# Patient Record
Sex: FEMALE | Race: WHITE | NOT HISPANIC OR LATINO | Employment: PART TIME | URBAN - METROPOLITAN AREA
[De-identification: names, ages, dates, MRNs, and addresses within clinical notes are randomized per-mention and may not be internally consistent; named-entity substitution may affect disease eponyms.]

---

## 2017-04-26 ENCOUNTER — TELEPHONE (OUTPATIENT)
Dept: URGENT CARE | Facility: CLINIC | Age: 42
End: 2017-04-26

## 2021-11-04 ENCOUNTER — OFFICE VISIT (OUTPATIENT)
Dept: ORTHOPEDIC SURGERY | Facility: CLINIC | Age: 46
End: 2021-11-04

## 2021-11-04 VITALS
WEIGHT: 268.96 LBS | BODY MASS INDEX: 49.49 KG/M2 | HEIGHT: 62 IN | SYSTOLIC BLOOD PRESSURE: 112 MMHG | DIASTOLIC BLOOD PRESSURE: 80 MMHG

## 2021-11-04 DIAGNOSIS — M75.21 BICEPS TENDINITIS OF RIGHT UPPER EXTREMITY: ICD-10-CM

## 2021-11-04 DIAGNOSIS — M75.51 BURSITIS OF RIGHT SHOULDER: ICD-10-CM

## 2021-11-04 DIAGNOSIS — M75.01 ADHESIVE CAPSULITIS OF RIGHT SHOULDER: Primary | ICD-10-CM

## 2021-11-04 DIAGNOSIS — M75.41 IMPINGEMENT SYNDROME OF RIGHT SHOULDER: ICD-10-CM

## 2021-11-04 PROCEDURE — 99204 OFFICE O/P NEW MOD 45 MIN: CPT | Performed by: ORTHOPAEDIC SURGERY

## 2021-11-04 RX ORDER — METHYLPREDNISOLONE 4 MG/1
TABLET ORAL
Qty: 1 EACH | Refills: 0 | Status: SHIPPED | OUTPATIENT
Start: 2021-11-04 | End: 2022-01-14

## 2021-11-04 RX ORDER — MELOXICAM 7.5 MG/1
TABLET ORAL
Qty: 30 TABLET | Refills: 1 | Status: SHIPPED | OUTPATIENT
Start: 2021-11-04 | End: 2022-01-14

## 2021-11-04 NOTE — PROGRESS NOTES
[facility] – [place] Orthopaedic Surgery Office Visit - Tacho Perkins MD    Office Visit       Patient Name: Lyndon Monaco    Chief Complaint:   Chief Complaint   Patient presents with   • Right Shoulder - Pain       Referring Physician: Angeles Amaya DNP, AP*-I appreciate the referral    History of Present Illness:   Lyndon Monaco is a 46 y.o. female who presents with right body part: shoulder Reason: pain.  Onset:Onset: atraumatic and gradual in nature. The issue has been ongoing for 2 month(s). Pain is a 2/10 on the pain scale. Pain is described as Pain Characterization: throbbing and sharp. Associated symptoms include Symptoms: pain. The pain is worse with sleeping, rising from seated position and reaching above, behind and lifting; ice and pain medication and/or NSAID improve the pain. Previous treatments have included: NSAIDS and physical therapy.  I have reviewed the patient's history of present illness as noted/entered above.    I have reviewed the patient's past medical history, surgical history, social history, family history, medications, and allergies as noted in the electronic medical record and as noted/entered.  I have reviewed the patient's review of systems as noted/enter and updated as noted in the patient's HPI.    Right shoulder pain lifting boxes few weeks ago prior to urgent treatment center visit on 10/30/2021    Angeles Amaya DNP, [facility] note reviewed from 10/30/2021 right shoulder pain    Perhaps a fall 5 years ago may explain the history of healed prior clavicle fracture but patient was unaware of that    ANTERIOR BICEPS PAIN - after lifting boxes; moving her daughter    Work: [employer] - ; daughter professional ballet dancer      Subjective   Subjective      Review of Systems   Constitutional: Negative.  Negative for chills, fatigue and fever.   HENT: Negative.  Negative for congestion and dental problem.    Eyes:  "Negative.  Negative for blurred vision.   Respiratory: Negative.  Negative for shortness of breath.    Cardiovascular: Negative.  Negative for leg swelling.   Gastrointestinal: Negative.  Negative for abdominal pain.   Endocrine: Negative.  Negative for polyuria.   Genitourinary: Negative.  Negative for difficulty urinating.   Musculoskeletal: Positive for arthralgias.   Skin: Negative.    Allergic/Immunologic: Negative.    Neurological: Negative.    Hematological: Negative.  Negative for adenopathy.   Psychiatric/Behavioral: Negative.  Negative for behavioral problems.        Past Medical History: No past medical history on file.    Past Surgical History:   Past Surgical History:   Procedure Laterality Date   •  SECTION     • CHOLECYSTECTOMY     • KNEE ARTHROSCOPY     • TONSILLECTOMY         Family History: No family history on file.    Social History:   Social History     Socioeconomic History   • Marital status: Other   Tobacco Use   • Smoking status: Never Smoker   • Smokeless tobacco: Never Used   Substance and Sexual Activity   • Alcohol use: Defer   • Drug use: Defer   • Sexual activity: Defer       Medications:   Current Outpatient Medications:   •  meloxicam (MOBIC) 7.5 MG tablet, 1 Oral Daily with food., Disp: 30 tablet, Rfl: 1  •  methylPREDNISolone (MEDROL) 4 MG dose pack, Use as directed by package instructions, Disp: 1 each, Rfl: 0    Allergies: No Known Allergies    The following portions of the patient's history were reviewed and updated as appropriate: allergies, current medications, past family history, past medical history, past social history, past surgical history and problem list.        Objective    Objective      Vital Signs:   Vitals:    21 1454   BP: 112/80   Weight: 122 kg (268 lb 15.4 oz)   Height: 157.5 cm (62.01\")       BMI 49     Ortho Exam:  General: no acute distress, comfortable  Vitals reviewed in chart    Musculoskeletal Exam    SIDE: RIGHT SHOULDER  Shoulder " Exam:  Range of motion measurements (degrees)  Forward flexion/Abduction/External rotation at side/ER at 90/IR at 90/IR position  Active: 130/130/30/70/30/buttock  Passive: 150/150/40/70/30/buttock      Anterior biceps pain    Diminished internal rotation and external rotation  Painful arc of motion  No evidence of septic joint  Pain with forward flexion and abduction greater than 120  Impingement testing Neer's test - positive/painful  Impingement testing Hawkin's test - positive/painful  Rotator cuff testing Anneliese's test - mild pain but no obvious weakness, pain limited  Rotator cuff testing External rotation - no weakness  Rotator cuff testing Lag signs - absent  Rotator cuff testing Belly press - negative  Scapular dyskinesis - present, abnormal scapular motion      Results Review:   Imaging Results (Last 24 Hours)     ** No results found for the last 24 hours. **        XR Shoulder 2+ View Right    Result Date: 10/31/2021  Old healed distal right clavicle fracture. No evidence of acute right shoulder trauma or advanced degenerative change.  DICTATED:   10/30/2021 EDITED/lfs:   10/30/2021    This report was finalized on 10/31/2021 9:02 PM by Dr. Gregorio Clark MD.          Right shoulder radiographs personally interpreted old healed distal third clavicle fracture.  No acute bony findings noted.    Procedures           Assessment / Plan      Assessment/Plan:   Problem List Items Addressed This Visit        Musculoskeletal and Injuries    Adhesive capsulitis of right shoulder - Primary    Relevant Medications    methylPREDNISolone (MEDROL) 4 MG dose pack    meloxicam (MOBIC) 7.5 MG tablet    Other Relevant Orders    Ambulatory Referral to Physical Therapy Ortho, Evaluate and treat    Biceps tendinitis of right upper extremity    Relevant Medications    methylPREDNISolone (MEDROL) 4 MG dose pack    meloxicam (MOBIC) 7.5 MG tablet    Other Relevant Orders    Ambulatory Referral to Physical Therapy Ortho, Evaluate and  treat    Bursitis of right shoulder    Relevant Medications    methylPREDNISolone (MEDROL) 4 MG dose pack    meloxicam (MOBIC) 7.5 MG tablet    Other Relevant Orders    Ambulatory Referral to Physical Therapy Ortho, Evaluate and treat    Impingement syndrome of right shoulder    Relevant Medications    methylPREDNISolone (MEDROL) 4 MG dose pack    meloxicam (MOBIC) 7.5 MG tablet    Other Relevant Orders    Ambulatory Referral to Physical Therapy Ortho, Evaluate and treat          RIGHT SHOULDER    Selective rest/activity modifications recommended while the shoulder recovers, although stretching and physical therapy are encouraged.    Medrol dosepak - risks and benefit of this medication was discussed including risks with Diabetes and bump in blood glucose.  A 6-day steroid Medrol dosepak was recommended followed by an NSAID after the dosepak is finished.      NSAIDs - risks and benefits of these medications were discussed.  These medications are certainly not without risks, but they can provide relief of pain caused due to capsular inflammation with this condition.    Physical therapy prescription provided and stretching program discussed    Steroid injection - a steroid injection can be beneficial and was offered.  Risks and benefits were discussed including a bump in blood glucose in the case of Diabetes.  She desires to hold for now.    Follow-up - the patient can schedule an appointment for 2 months pending progress with the nonoperative treatment program    Patient counseling was performed with a discussion of the following:  What is a frozen shoulder?  Frozen Shoulder or Idiopathic Adhesive Capsulitis - the patient has a diagnosis of idiopathic adhesive capsulitis or “frozen shoulder.”  We discussed that this condition can have variable “freezing” and “thawing” phases that can be very painful.  Fortunately, this condition rarely requires operative intervention and responds to conservative measures gradually  over time.  Unfortunately, I did  that the symptoms can last up to 6-12 months in some patients and frozen shoulder can return to the same shoulder or impact the other shoulder in the future.    What is our plan to help nonoperatively treat frozen shoulder?  We discussed a plan for selective rest/activity modification, NSAIDs - risks and benefits of these medications discussed, frozen shoulder exercises demonstrated with emphasis on range of motion and physical therapy prescription given, and the option of a corticosteroid injection.  The patient may be a candidate for a 6-day Medrol dose pack and then start an NSAID after the Medrol dose pack is finished.      When will I see the patient back?  I will see the patient back in 2 months to follow-up their progress pending progress or sooner if needed.  If the patient is improved then they can call to cancel the appointment.  If the patient has improved range of motion, but continued pain, then we will look for other potential causes of shoulder pain at the follow-up appointment.  The meredith now is to improve the range of motion and gradually decrease the pain they are experiencing.    Is an MRI needed at this time?   I counseled the patient that an MRI will not diagnose a frozen shoulder, but an MRI is indicated in the patient is refractory to our nonoperative treatment program for frozen shoulder.  An MRI can help to look for other potential causes of shoulder pain pending response to nonoperative treatment.      Follow Up: 2 months        Tacho Perkins MD, FAAOS  Orthopedic Surgeon  Fellowship Trained Shoulder and Elbow Surgeon  [facility]  Orthopedics and Sports Medicine  [address] [place] [address], [address]  [place] [address]    11/08/21  14:07 EST    Please note that portions of this note may have been completed with a voice recognition program. Efforts were made to edit the dictations, but occasionally words are mistranscribed.

## 2021-11-08 ENCOUNTER — TELEPHONE (OUTPATIENT)
Dept: ORTHOPEDIC SURGERY | Facility: CLINIC | Age: 46
End: 2021-11-08

## 2021-11-08 DIAGNOSIS — M75.01 ADHESIVE CAPSULITIS OF RIGHT SHOULDER: Primary | ICD-10-CM

## 2021-11-08 DIAGNOSIS — M75.21 BICEPS TENDINITIS OF RIGHT UPPER EXTREMITY: ICD-10-CM

## 2021-11-08 DIAGNOSIS — M75.51 BURSITIS OF RIGHT SHOULDER: ICD-10-CM

## 2021-11-08 DIAGNOSIS — M75.41 IMPINGEMENT SYNDROME OF RIGHT SHOULDER: ICD-10-CM

## 2021-11-08 NOTE — TELEPHONE ENCOUNTER
Caller: HOLA ANNA    Relationship: SELF    Best call back number: [phone]    What orders are you requesting (i.e. lab or imaging): PHYSICAL THERAPY    In what timeframe would the patient need to come in: PATIENT IS ALREADY SCHEDULED 11.11.21-- PATIENT REQUESTED THAT IT BE SENT ASAP BECAUSE THEY ALREADY SCHEDULED HER. TY!    Where will you receive your lab/imaging services: [facility] IN [facility] LOCATION    [phone]- PHONE   [phone] FAX    PATIENT WASN'T ORDERED SUBMITTED HERE FOR T- NOT THROUGH [facility]. TY!

## 2022-01-14 ENCOUNTER — OFFICE VISIT (OUTPATIENT)
Dept: ORTHOPEDIC SURGERY | Facility: CLINIC | Age: 47
End: 2022-01-14

## 2022-01-14 VITALS
DIASTOLIC BLOOD PRESSURE: 74 MMHG | BODY MASS INDEX: 50.31 KG/M2 | WEIGHT: 273.4 LBS | HEIGHT: 62 IN | SYSTOLIC BLOOD PRESSURE: 122 MMHG

## 2022-01-14 DIAGNOSIS — M75.51 BURSITIS OF RIGHT SHOULDER: ICD-10-CM

## 2022-01-14 DIAGNOSIS — M75.01 ADHESIVE CAPSULITIS OF RIGHT SHOULDER: Primary | ICD-10-CM

## 2022-01-14 DIAGNOSIS — M75.21 BICEPS TENDINITIS OF RIGHT UPPER EXTREMITY: ICD-10-CM

## 2022-01-14 DIAGNOSIS — M75.41 IMPINGEMENT SYNDROME OF RIGHT SHOULDER: ICD-10-CM

## 2022-01-14 PROCEDURE — 99213 OFFICE O/P EST LOW 20 MIN: CPT | Performed by: ORTHOPAEDIC SURGERY

## 2022-01-14 NOTE — PROGRESS NOTES
[facility] [place] [facility] [place] Follow Up       Office Follow Up Visit       Patient Name: Lyndon Monaco    Chief Complaint:   Chief Complaint   Patient presents with   • Follow-up     2 months follow up Adhesive capsulitis of right shoulder       Referring Physician: No ref. provider found    History of Present Illness:   It has been 2  month(s) since Lyndon Monaco's last visit. Lyndon Monaco returns to clinic today for F/U: follow-up of rightBody Part: shoulderReason: Adhesive capsulitis. The issue has been ongoing for 6 month(s). Lyndon Monaco rates HIS/HER: herpain at 2/10 on the pain scale. Previous/current treatments: NSAIDS and physical therapy. Current symptoms:Symptoms: pain and stiffness. The pain is worse with any movement of the joint; resting, ice, heat and pain medication and/or NSAID improves the pain. Overall, he/she: sheis doing better.  I have reviewed the patient's history of present illness as noted/entered above.    I have reviewed the patient's past medical history, surgical history, social history, family history, medications, and allergies as noted in the electronic medical record and as noted/entered.  I have reviewed the patient's review of systems as noted/enter and updated as noted in the patient's HPI.    Right shoulder pain lifting boxes few weeks ago prior to urgent treatment center visit on 10/30/2021     Angeles Amaya DNP, [facility] note reviewed from 10/30/2021 right shoulder pain     Perhaps a fall 5 years ago may explain the history of healed prior clavicle fracture but patient was unaware of that     ANTERIOR BICEPS PAIN - after lifting boxes; moving her daughter     Work: [employer] - ; daughter professional ballet dancer    Prior visit was 11/4/2021, desires to hold on injection at that time    1/14/2022:  Right shoulder some limitations persist now but she is working with results physiotherapy and seeing  some gains.  Pain and strength have improved but still some limitations with range of motion she notes    Right frozen shoulder, biceps tendinitis, impingement syndrome    Slow gains with PT; 2 week break with holidays and weather and didn't lose ground    BMI = 50           Subjective   Subjective      Review of Systems   Constitutional: Negative.  Negative for chills, fatigue and fever.   HENT: Negative.  Negative for congestion and dental problem.    Eyes: Negative.  Negative for blurred vision.   Respiratory: Negative.  Negative for shortness of breath.    Cardiovascular: Negative.  Negative for leg swelling.   Gastrointestinal: Negative.  Negative for abdominal pain.   Endocrine: Negative.  Negative for polyuria.   Genitourinary: Negative.  Negative for difficulty urinating.   Musculoskeletal: Positive for arthralgias.   Skin: Negative.    Allergic/Immunologic: Negative.    Neurological: Negative.    Hematological: Negative.  Negative for adenopathy.   Psychiatric/Behavioral: Negative.  Negative for behavioral problems.        Past Medical History: No past medical history on file.    Past Surgical History:   Past Surgical History:   Procedure Laterality Date   •  SECTION     • CHOLECYSTECTOMY     • KNEE ARTHROSCOPY     • TONSILLECTOMY         Family History: No family history on file.    Social History:   Social History     Socioeconomic History   • Marital status: Other   Tobacco Use   • Smoking status: Never Smoker   • Smokeless tobacco: Never Used   Substance and Sexual Activity   • Alcohol use: Defer   • Drug use: Defer   • Sexual activity: Defer       Medications: No current outpatient medications on file.    Allergies: No Known Allergies    The following portions of the patient's history were reviewed and updated as appropriate: allergies, current medications, past family history, past medical history, past social history, past surgical history and problem list.        Objective    Objective   "    Vital Signs:   Vitals:    01/14/22 0937   BP: 122/74   Weight: 124 kg (273 lb 6.4 oz)   Height: 157.5 cm (62.01\")       Ortho Exam:  RIGHT SHOULDER  Frozen shoulder  AROM limited 100/100/35/buttock  PROM 120/120/50/80/70  Good RC and biceps strength    Results Review:  Imaging Results (Last 24 Hours)     ** No results found for the last 24 hours. **          XR Shoulder 2+ View Right    Result Date: 10/31/2021  Old healed distal right clavicle fracture. No evidence of acute right shoulder trauma or advanced degenerative change.  DICTATED:   10/30/2021 EDITED/lfs:   10/30/2021    This report was finalized on 10/31/2021 9:02 PM by Dr. Gregorio Clark MD.            Procedures    Desires to hold        Assessment / Plan      Assessment/Plan:   Problem List Items Addressed This Visit        Musculoskeletal and Injuries    Adhesive capsulitis of right shoulder - Primary    Relevant Orders    Ambulatory Referral to Physical Therapy (Completed)    Biceps tendinitis of right upper extremity    Relevant Orders    Ambulatory Referral to Physical Therapy (Completed)    Bursitis of right shoulder    Relevant Orders    Ambulatory Referral to Physical Therapy (Completed)    Impingement syndrome of right shoulder    Relevant Orders    Ambulatory Referral to Physical Therapy (Completed)          RIGHT FROZEN SHOULDER    Counseled on treatment options, desires to hold on injection today and we will hold on MRI  Counseled on operative vs nonoperative measures and continue to recommend PT and she agrees    Follow Up: 2 months, PT Rx      Tacho Perkins MD, FAAOS  Orthopedic Surgeon  Fellowship Trained Shoulder and Elbow Surgeon  [facility]  Orthopedics and Sports Medicine  [address], [address]  [place] [address]    01/14/22  10:14 EST  "

## 2022-03-15 ENCOUNTER — OFFICE VISIT (OUTPATIENT)
Dept: ORTHOPEDIC SURGERY | Facility: CLINIC | Age: 47
End: 2022-03-15

## 2022-03-15 VITALS
BODY MASS INDEX: 50.61 KG/M2 | SYSTOLIC BLOOD PRESSURE: 120 MMHG | DIASTOLIC BLOOD PRESSURE: 72 MMHG | HEIGHT: 62 IN | WEIGHT: 275 LBS

## 2022-03-15 DIAGNOSIS — M75.01 ADHESIVE CAPSULITIS OF RIGHT SHOULDER: Primary | ICD-10-CM

## 2022-03-15 DIAGNOSIS — M75.41 IMPINGEMENT SYNDROME OF RIGHT SHOULDER: ICD-10-CM

## 2022-03-15 DIAGNOSIS — M75.51 BURSITIS OF RIGHT SHOULDER: ICD-10-CM

## 2022-03-15 DIAGNOSIS — M75.21 BICEPS TENDINITIS OF RIGHT UPPER EXTREMITY: ICD-10-CM

## 2022-03-15 PROCEDURE — 99213 OFFICE O/P EST LOW 20 MIN: CPT | Performed by: ORTHOPAEDIC SURGERY

## 2022-03-15 NOTE — PROGRESS NOTES
[facility] [place] [facility]edic [facility] Office Follow Up       Office Follow Up Visit       Patient Name: Lyndon Monaco    Chief Complaint:   Chief Complaint   Patient presents with   • Follow-up     2 month follow up -- Adhesive capsulitis of right shoulder       Referring Physician: No ref. provider found    History of Present Illness:   It has been 2  month(s) since Lyndon Monaco's last visit. Lyndon Monaco returns to clinic today for F/U: follow-up of rightBody Part: shoulderReason: pain. The issue has been ongoing for 8 month(s). Lyndon Monaco rates HIS/HER: herpain at 2/10 on the pain scale. Previous/current treatments: NSAIDS and physical therapy. Current symptoms:Symptoms: same as prior visit. The pain is worse with working, lying on affected side and any movement of the joint; resting, ice and heat improves the pain. Overall, he/she: sheis doing better.  I have reviewed the patient's history of present illness as noted/entered above.    I have reviewed the patient's past medical history, surgical history, social history, family history, medications, and allergies as noted in the electronic medical record and as noted/entered.  I have reviewed the patient's review of systems as noted/enter and updated as noted in the patient's HPI.    Right shoulder pain lifting boxes few weeks ago prior to urgent treatment center visit on 10/30/2021     Angeles Amaya DNP, [facility] note reviewed from 10/30/2021 right shoulder pain     Perhaps a fall 5 years ago may explain the history of healed prior clavicle fracture but patient was unaware of that     ANTERIOR BICEPS PAIN - after lifting boxes; moving her daughter     Work: [employer] - ; daughter professional ballet dancer     Prior visit was 11/4/2021, desires to hold on injection at that time     1/14/2022:  Right shoulder some limitations persist now but she is working with results physiotherapy and seeing some  gains.  Pain and strength have improved but still some limitations with range of motion she notes     Right frozen shoulder, biceps tendinitis, impingement syndrome     Slow gains with PT; 2 week break with holidays and weather and didn't lose ground     BMI = 50       3/15/2022:    Right shoulder improved/better  Results physiotherapy  Anterior biceps tendonitis persists -- still some pain; strength better      Subjective   Subjective      Review of Systems   Constitutional: Negative.  Negative for chills, fatigue and fever.   HENT: Negative.  Negative for congestion and dental problem.    Eyes: Negative.  Negative for blurred vision.   Respiratory: Negative.  Negative for shortness of breath.    Cardiovascular: Negative.  Negative for leg swelling.   Gastrointestinal: Negative.  Negative for abdominal pain.   Endocrine: Negative.  Negative for polyuria.   Genitourinary: Negative.  Negative for difficulty urinating.   Musculoskeletal: Positive for arthralgias.   Skin: Negative.    Allergic/Immunologic: Negative.    Neurological: Negative.    Hematological: Negative.  Negative for adenopathy.   Psychiatric/Behavioral: Negative.  Negative for behavioral problems.        Past Medical History: History reviewed. No pertinent past medical history.    Past Surgical History:   Past Surgical History:   Procedure Laterality Date   •  SECTION     • CHOLECYSTECTOMY     • KNEE ARTHROSCOPY     • TONSILLECTOMY         Family History: History reviewed. No pertinent family history.    Social History:   Social History     Socioeconomic History   • Marital status: Other   Tobacco Use   • Smoking status: Never Smoker   • Smokeless tobacco: Never Used   Substance and Sexual Activity   • Alcohol use: Defer   • Drug use: Defer   • Sexual activity: Defer       Medications: No current outpatient medications on file.    Allergies: No Known Allergies    The following portions of the patient's history were reviewed and updated as  "appropriate: allergies, current medications, past family history, past medical history, past social history, past surgical history and problem list.        Objective    Objective      Vital Signs:   Vitals:    03/15/22 1029   BP: 120/72   Weight: 125 kg (275 lb)   Height: 157.5 cm (62.01\")       Ortho Exam:  RIGHT anterior biceps pain  Still some decreased ER and IR  Good rotator cuff strength  Well perfused    Results Review:  Imaging Results (Last 24 Hours)     ** No results found for the last 24 hours. **          Procedures          Assessment / Plan      Assessment/Plan:   Problem List Items Addressed This Visit        Musculoskeletal and Injuries    Adhesive capsulitis of right shoulder - Primary    Relevant Orders    Ambulatory Referral to Physical Therapy (Completed)    Biceps tendinitis of right upper extremity    Relevant Orders    Ambulatory Referral to Physical Therapy (Completed)    Bursitis of right shoulder    Relevant Orders    Ambulatory Referral to Physical Therapy (Completed)    Impingement syndrome of right shoulder    Relevant Orders    Ambulatory Referral to Physical Therapy (Completed)          RIGHT SHOULDER   Frozen shoulder  Biceps tendonitis    Follow Up: 2-3 months pending progress      Tacho Perkins MD, FAAOS  Orthopedic Surgeon  Fellowship Trained Shoulder and Elbow Surgeon  [facility]  Orthopedics and Sports Medicine  [address] [place] [address], [address]  [place] [address]    03/15/22  10:59 EDT  "

## 2023-07-31 ENCOUNTER — TELEPHONE (OUTPATIENT)
Dept: INTERNAL MEDICINE | Facility: CLINIC | Age: 48
End: 2023-07-31

## 2023-07-31 NOTE — TELEPHONE ENCOUNTER
Caller: Lyndon oMnaco    Relationship to patient: Self    Best call back number: [phone]     Chief complaint: NONE    Type of visit: NEW PATIENT PHYSICAL    Requested date: ANYTIME    Additional notes:  PATIENT WOULD LIKE TO SEE DR GUADARRAMA AS A NEW PATIENT, BUT SHE DID NOT HAVE ANY APPOINTMENTS LISTED.

## 2024-01-25 ENCOUNTER — OFFICE VISIT (OUTPATIENT)
Dept: FAMILY MEDICINE CLINIC | Facility: CLINIC | Age: 49
End: 2024-01-25
Payer: COMMERCIAL

## 2024-01-25 ENCOUNTER — LAB (OUTPATIENT)
Dept: LAB | Facility: HOSPITAL | Age: 49
End: 2024-01-25
Payer: COMMERCIAL

## 2024-01-25 VITALS
HEART RATE: 76 BPM | HEIGHT: 62 IN | BODY MASS INDEX: 52.74 KG/M2 | SYSTOLIC BLOOD PRESSURE: 124 MMHG | OXYGEN SATURATION: 96 % | DIASTOLIC BLOOD PRESSURE: 82 MMHG | WEIGHT: 286.6 LBS

## 2024-01-25 DIAGNOSIS — E28.39 PREMATURE OVARIAN INSUFFICIENCY: ICD-10-CM

## 2024-01-25 DIAGNOSIS — Z23 ENCOUNTER FOR IMMUNIZATION: ICD-10-CM

## 2024-01-25 DIAGNOSIS — Z00.00 ANNUAL PHYSICAL EXAM: ICD-10-CM

## 2024-01-25 DIAGNOSIS — Z78.0 POSTMENOPAUSAL: ICD-10-CM

## 2024-01-25 DIAGNOSIS — Z00.00 ANNUAL PHYSICAL EXAM: Primary | ICD-10-CM

## 2024-01-25 LAB
ALBUMIN SERPL-MCNC: 4.1 G/DL (ref 3.5–5.2)
ALBUMIN/GLOB SERPL: 1.3 G/DL
ALP SERPL-CCNC: 90 U/L (ref 39–117)
ALT SERPL W P-5'-P-CCNC: 28 U/L (ref 1–33)
ANION GAP SERPL CALCULATED.3IONS-SCNC: 11.3 MMOL/L (ref 5–15)
AST SERPL-CCNC: 22 U/L (ref 1–32)
BILIRUB SERPL-MCNC: 0.4 MG/DL (ref 0–1.2)
BUN SERPL-MCNC: 10 MG/DL (ref 6–20)
BUN/CREAT SERPL: 12.3 (ref 7–25)
CALCIUM SPEC-SCNC: 9.2 MG/DL (ref 8.6–10.5)
CHLORIDE SERPL-SCNC: 105 MMOL/L (ref 98–107)
CHOLEST SERPL-MCNC: 268 MG/DL (ref 0–200)
CO2 SERPL-SCNC: 25.7 MMOL/L (ref 22–29)
CREAT SERPL-MCNC: 0.81 MG/DL (ref 0.57–1)
EGFRCR SERPLBLD CKD-EPI 2021: 88.6 ML/MIN/1.73
GLOBULIN UR ELPH-MCNC: 3.2 GM/DL
GLUCOSE SERPL-MCNC: 104 MG/DL (ref 65–99)
HDLC SERPL-MCNC: 42 MG/DL (ref 40–60)
LDLC SERPL CALC-MCNC: 208 MG/DL (ref 0–100)
LDLC/HDLC SERPL: 4.9 {RATIO}
POTASSIUM SERPL-SCNC: 4.3 MMOL/L (ref 3.5–5.2)
PROT SERPL-MCNC: 7.3 G/DL (ref 6–8.5)
SODIUM SERPL-SCNC: 142 MMOL/L (ref 136–145)
TRIGL SERPL-MCNC: 100 MG/DL (ref 0–150)
TSH SERPL DL<=0.05 MIU/L-ACNC: 1.79 UIU/ML (ref 0.27–4.2)
VLDLC SERPL-MCNC: 18 MG/DL (ref 5–40)

## 2024-01-25 PROCEDURE — 82607 VITAMIN B-12: CPT

## 2024-01-25 PROCEDURE — 84146 ASSAY OF PROLACTIN: CPT

## 2024-01-25 PROCEDURE — 80053 COMPREHEN METABOLIC PANEL: CPT

## 2024-01-25 PROCEDURE — 80061 LIPID PANEL: CPT

## 2024-01-25 PROCEDURE — 85027 COMPLETE CBC AUTOMATED: CPT

## 2024-01-25 PROCEDURE — 83001 ASSAY OF GONADOTROPIN (FSH): CPT

## 2024-01-25 PROCEDURE — 36415 COLL VENOUS BLD VENIPUNCTURE: CPT

## 2024-01-25 PROCEDURE — 82670 ASSAY OF TOTAL ESTRADIOL: CPT

## 2024-01-25 PROCEDURE — 83036 HEMOGLOBIN GLYCOSYLATED A1C: CPT

## 2024-01-25 PROCEDURE — 83002 ASSAY OF GONADOTROPIN (LH): CPT

## 2024-01-25 PROCEDURE — 84443 ASSAY THYROID STIM HORMONE: CPT

## 2024-01-25 NOTE — PROGRESS NOTES
Female Physical Note      Date: 2024   Patient Name: Lyndon Monaco  : 1975   MRN: [id number]     Chief Complaint:    Chief Complaint   Patient presents with    New patient preventative medicine services     Establish care       History of Present Illness: Lyndon Monaco is a 48 y.o. female who is here today for their annual health maintenance and physical.  She is also here today to establish care.  She previously underwent medical care in [place], [place].    Patient reports she is overall doing well.  She is currently not on any prescribed medication.  Is due for some screening lab work today.  Patient does report regular physical activity and is mindful of her diet.  Due to her daughter's extensive allergens she is mindful of her overall diet composition.  She does cook with avocado and coconut oils and avoid soy and nut products.  Patient does note that she does suffer from anorexia.  She reports that she does not have hunger cues and if she is not consciously scheduling eating she will often go 24 to 36 hours without eating a meal.  She reports she has seen a registered dietitian in the past and her anorexia is not psychological in nature.  She does note that she will have some adverse health effects from the yoyoing in her diet affecting her metabolism and she has been actively working to regulate this.  She has undergone body composition testing in the past and was found to have significant muscle mass.    Patient is currently postmenopausal.  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  She elected against hormone therapy.  She previously followed up with an endocrinologist for this but has been sometime.  It was recommended that she undergo routine bone density scans, and she is due for this today.  She reports that she is a  with a  delivery.  She reports that her father has Athol's disease and her sister has Cushing's disease.  Her father was  unfortunately exposed to agent orange and this has led to infertility in her family.  She declines a mammogram today.  She would be interested in updating her Pap smear today.  She does note as she is postmenopausal she will have some dyspareunia, clear vaginal discharge, and vaginal dryness.  She would also be interested in Cologuard testing.    Patient is interested in receiving a tetanus booster today.  She has previously received tetanus booster's with no complication.  Patient does have a latex allergy listed but states that this is a sensitivity that is a localized skin reaction.  Advised patient that there is a alternative vial formulation of tetanus vaccine that is not available at our clinic, however she does like today to undergo the vaccine and does understand potential risk.  Subjective      Review of Systems:   Review of Systems   All other systems reviewed and are negative.      Past Medical History, Social History, Family History and Care Team were all reviewed with patient and updated as appropriate.     Medications:   No current outpatient medications on file.    Allergies:   No Known Allergies    Immunizations:  Td/Tdap(Booster Q 10 yrs): Received today.  Flu (Yearly): Declined  Colorectal Screening:     Last Completed Colonoscopy       This patient has no relevant Health Maintenance data.        Will order Cologuard testing today  Pap:    Last Completed Pap Smear       This patient has no relevant Health Maintenance data.        Completed today  Mammogram:    Last Completed Mammogram       This patient has no relevant Health Maintenance data.           Patient declined  Bone Density/DEXA: DEXA scan, DEXA scan ordered to evaluate the patient for osteoporosis.  Patient did have premature menopause.      PHQ-2 Depression Screening  Little interest or pleasure in doing things? 0-->not at all   Feeling down, depressed, or hopeless? 0-->not at all   PHQ-2 Total Score 0        Objective     Physical  "Exam:  Vital Signs:   Vitals:    01/25/24 1259   BP: 124/82   Pulse: 76   SpO2: 96%   Weight: 130 kg (286 lb 9.6 oz)   Height: 157.5 cm (62.01\")       Physical Exam  Vitals and nursing note reviewed. Exam conducted with a chaperone present.   Constitutional:       Appearance: Normal appearance. She is normal weight.   HENT:      Head: Normocephalic and atraumatic.      Nose: Nose normal.      Mouth/Throat:      Mouth: Mucous membranes are moist.   Eyes:      Extraocular Movements: Extraocular movements intact.      Pupils: Pupils are equal, round, and reactive to light.   Cardiovascular:      Rate and Rhythm: Normal rate and regular rhythm.   Pulmonary:      Effort: Pulmonary effort is normal.      Breath sounds: Normal breath sounds.   Abdominal:      General: Abdomen is flat.   Genitourinary:     Vagina: Normal.      Cervix: Discharge present.      Uterus: Normal.       Adnexa: Right adnexa normal.      Rectum: Normal.   Musculoskeletal:         General: Normal range of motion.      Cervical back: Normal range of motion.   Skin:     General: Skin is warm.   Neurological:      General: No focal deficit present.      Mental Status: She is alert and oriented to person, place, and time.   Psychiatric:         Mood and Affect: Mood normal.         Behavior: Behavior normal.         Thought Content: Thought content normal.         Judgment: Judgment normal.         Procedures    Assessment / Plan      Assessment/Plan:   Diagnoses and all orders for this visit:    1. Annual physical exam (Primary)  Assessment & Plan:  Overall doing well.  Will perform preventative lab screening today.  Tetanus booster given today.    Orders:  -     Cologuard - Stool, Per Rectum; Future  -     CBC (No Diff); Future  -     Comprehensive Metabolic Panel; Future  -     Hemoglobin A1c; Future  -     Lipid Panel; Future  -     TSH; Future  -     Vitamin B12; Future  -     LIQUID-BASED PAP SMEAR WITH HPV GENOTYPING REGARDLESS OF INTERPRETATION " (ALIE,COR,MAD); Future  -     LIQUID-BASED PAP SMEAR WITH HPV GENOTYPING REGARDLESS OF INTERPRETATION (ALIE,COR,MAD)    2. Encounter for immunization  -     Tdap Vaccine => 8yo IM (BOOSTRIX)    3. Postmenopausal  Assessment & Plan:  Will obtain records from previous endocrinology.  Pap smear performed today.  Will obtain DEXA scan to assess bone density.    Orders:  -     DEXA Bone Density Axial; Future        Follow Up:   Return in about 1 year (around 1/25/2025) for Annual.    Healthcare Maintenance:   Counseling provided on immunizations, preventative screenings/.  Lyndon Monaco voices understanding and acceptance of this advice and will call back with any further questions or concerns. AVS with preventive healthcare tips printed for patient.     Mckenna Aldrich DO   [facility] [place] [address]

## 2024-01-25 NOTE — ASSESSMENT & PLAN NOTE
Will obtain records from previous endocrinology.  Pap smear performed today.  Will obtain DEXA scan to assess bone density.

## 2024-01-25 NOTE — PROGRESS NOTES
"     New Patient Office Visit      Date: 2024   Patient Name: Lyndon Monaco  : 1975   MRN: [id number]     Chief Complaint:    Chief Complaint   Patient presents with    New patient preventative medicine services     Establish care       History of Present Illness: Lyndon Monaco is a 48 y.o. female who is here today to establish care.  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  No vaginal   Pain with intercourse.   Pap smear WNL- 15 year. Pap smear    Subjective      Review of Systems:   Review of Systems    Past Medical History:   Past Medical History:   Diagnosis Date    Obesity        Past Surgical History:   Past Surgical History:   Procedure Laterality Date     SECTION      CHOLECYSTECTOMY      KNEE ARTHROSCOPY      TONSILLECTOMY         Family History:   Family History   Problem Relation Age of Onset    Cancer Mother     Diabetes Mother     Diabetes Maternal Grandmother     Stroke Maternal Grandmother     Stroke Paternal Grandmother     Cancer Maternal Aunt     Diabetes Maternal Aunt     Diabetes Maternal Aunt     Diabetes Maternal Aunt     Diabetes Maternal Uncle        Social History:   Social History     Socioeconomic History    Marital status:    Tobacco Use    Smoking status: Never    Smokeless tobacco: Never   Substance and Sexual Activity    Alcohol use: Never    Drug use: Never    Sexual activity: Not Currently     Partners: Male     Birth control/protection: Post-menopausal       Medications:   No current outpatient medications on file.    Allergies:   No Known Allergies    Objective     Physical Exam:  Vital Signs:   Vitals:    24 1259   BP: 124/82   Pulse: 76   SpO2: 96%   Weight: 130 kg (286 lb 9.6 oz)   Height: 157.5 cm (62.01\")     Body mass index is 52.41 " kg/m².  {Class 3 Severe Obesity (BMI >=40). (Optional):85362}       Physical Exam    Procedures    Results:   PHQ-9 Total Score: 0        Labs: ***   No results found for this or any previous visit (from the past 24 hour(s)).     Imaging: ***     Assessment / Plan      Assessment/Plan:   There are no diagnoses linked to this encounter.     Follow Up:   No follow-ups on file.    Mckenna Aldrich DO   [facility]

## 2024-01-25 NOTE — LETTER
[facility]  Vaccine Consent Form    Patient Name:  Lyndon Monaco  Patient :  1975     Vaccine(s) Ordered    Tdap Vaccine => 6yo IM (BOOSTRIX)        Screening Checklist  The following questions should be completed prior to vaccination. If you answer “yes” to any question, it does not necessarily mean you should not be vaccinated. It just means we may need to clarify or ask more questions. If a question is unclear, please ask your healthcare provider to explain it.    Yes No   Any fever or moderate to severe illness today (mild illness and/or antibiotic treatment are not contraindications)?     Do you have a history of a serious reaction to any previous vaccinations, such as anaphylaxis, encephalopathy within 7 days, Guillain-Davis syndrome within 6 weeks, seizure?     Have you received any live vaccine(s) (e.g MMR, MIC) or any other vaccines in the last month (to ensure duplicate doses aren't given)?     Do you have an anaphylactic allergy to latex (DTaP, DTaP-IPV, Hep A, Hep B, MenB, RV, Td, Tdap), baker’s yeast (Hep B, HPV), polysorbates (RSV, nirsevimab, PCV 20, Rotavirrus, Tdap, Shingrix), or gelatin (MIC, MMR)?     Do you have an anaphylactic allergy to neomycin (Rabies, MIC, MMR, IPV, Hep A), polymyxin B (IPV), or streptomycin (IPV)?      Any cancer, leukemia, AIDS, or other immune system disorder? (MIC, MMR, RV)     Do you have a parent, brother, or sister with an immune system problem (if immune competence of vaccine recipient clinically verified, can proceed)? (MMR, MIC)     Any recent steroid treatments for >2 weeks, chemotherapy, or radiation treatment? (MIC, MMR)     Have you received antibody-containing blood transfusions or IVIG in the past 11 months (recommended interval is dependent on product)? (MMR, MIC)     Have you taken antiviral drugs (acyclovir, famciclovir, valacyclovir for MIC) in the last 24 or 48 hours, respectively?      Are you pregnant or planning to become pregnant within 1  "month? (MIC, MMR, HPV, IPV, MenB, Abrexvy; For Hep B- refer to Engerix-B; For RSV - Abrysvo is indicated for 32-36 weeks of pregnancy from September to January)     For infants, have you ever been told your child has had intussusception or a medical emergency involving obstruction of the intestine (Rotavirus)? If not for an infant, can skip this question.         *Ordering Physicians/APC should be consulted if \"yes\" is checked by the patient or guardian above.  I have received, read, and understand the Vaccine Information Statement (VIS) for each vaccine ordered.  I have considered my or my child's health status as well as the health status of my close contacts.  I have taken the opportunity to discuss my vaccine questions with my or my child's health care provider.   I have requested that the ordered vaccine(s) be given to me or my child.  I understand the benefits and risks of the vaccines.  I understand that I should remain in the clinic for 15 minutes after receiving the vaccine(s).  _________________________________________________________  Signature of Patient or Parent/Legal Guardian ____________________  Date     "

## 2024-01-26 DIAGNOSIS — E28.39 PREMATURE OVARIAN INSUFFICIENCY: Primary | ICD-10-CM

## 2024-01-26 DIAGNOSIS — E11.65 TYPE 2 DIABETES MELLITUS WITH HYPERGLYCEMIA, WITHOUT LONG-TERM CURRENT USE OF INSULIN: ICD-10-CM

## 2024-01-26 DIAGNOSIS — Z78.0 POSTMENOPAUSAL: Primary | ICD-10-CM

## 2024-01-26 DIAGNOSIS — E11.65 TYPE 2 DIABETES MELLITUS WITH HYPERGLYCEMIA, WITHOUT LONG-TERM CURRENT USE OF INSULIN: Primary | ICD-10-CM

## 2024-01-26 DIAGNOSIS — E78.00 PURE HYPERCHOLESTEROLEMIA: ICD-10-CM

## 2024-01-26 LAB
DEPRECATED RDW RBC AUTO: 42.8 FL (ref 37–54)
ERYTHROCYTE [DISTWIDTH] IN BLOOD BY AUTOMATED COUNT: 13.1 % (ref 12.3–15.4)
ESTRADIOL SERPL HS-MCNC: 27.3 PG/ML
FSH SERPL-ACNC: 32.3 MIU/ML
HBA1C MFR BLD: 6.9 % (ref 4.8–5.6)
HCT VFR BLD AUTO: 48.3 % (ref 34–46.6)
HGB BLD-MCNC: 15.9 G/DL (ref 12–15.9)
LH SERPL-ACNC: 26.5 MIU/ML
MCH RBC QN AUTO: 29.4 PG (ref 26.6–33)
MCHC RBC AUTO-ENTMCNC: 32.9 G/DL (ref 31.5–35.7)
MCV RBC AUTO: 89.4 FL (ref 79–97)
PLATELET # BLD AUTO: 403 10*3/MM3 (ref 140–450)
PMV BLD AUTO: 10.7 FL (ref 6–12)
PROLACTIN SERPL-MCNC: 6.45 NG/ML (ref 4.79–23.3)
RBC # BLD AUTO: 5.4 10*6/MM3 (ref 3.77–5.28)
VIT B12 BLD-MCNC: 368 PG/ML (ref 211–946)
WBC NRBC COR # BLD AUTO: 9.96 10*3/MM3 (ref 3.4–10.8)

## 2024-01-31 LAB — REF LAB TEST METHOD: NORMAL

## 2024-02-01 ENCOUNTER — PATIENT ROUNDING (BHMG ONLY) (OUTPATIENT)
Dept: FAMILY MEDICINE CLINIC | Facility: CLINIC | Age: 49
End: 2024-02-01
Payer: COMMERCIAL

## 2024-02-09 ENCOUNTER — HOSPITAL ENCOUNTER (OUTPATIENT)
Dept: NUTRITION | Facility: HOSPITAL | Age: 49
Setting detail: RECURRING SERIES
Discharge: HOME | End: 2024-02-09

## 2024-02-09 PROCEDURE — 97802 MEDICAL NUTRITION INDIV IN: CPT

## 2024-02-09 NOTE — CONSULTS
Psychiatric Nutrition Services          Initial 60 Minute Nutrition Visit    Date: 2024   Patient Name: Lyndon Monaco  : 1975   MRN: [id number]   Referring Provider: Mckenna Aldrich, *    Reason for Visit: wt loss; pt is also being treated for Type 2 DM and she does not have her gallbladder  Visit Format: in-person    Nutrition Assessment       Social History:   Social History     Socioeconomic History    Marital status:    Tobacco Use    Smoking status: Never    Smokeless tobacco: Never   Substance and Sexual Activity    Alcohol use: Never    Drug use: Never    Sexual activity: Not Currently     Partners: Male     Birth control/protection: Post-menopausal     Active Problem List:   Patient Active Problem List    Diagnosis     Annual physical exam [Z00.00]     Postmenopausal [Z78.0]     Adhesive capsulitis of right shoulder [M75.01]     Biceps tendinitis of right upper extremity [M75.21]     Bursitis of right shoulder [M75.51]     Impingement syndrome of right shoulder [M75.41]       Current Medications: No current outpatient medications on file.    Labs: n/a    Hunger Vital Sign Food Insecurity Assessment:  Within the past 12 months I/we worried whether our food would run out before I/we got money to buy more: no   Within the past 12 months the food I/we bought just didn't last and I/we didn't have money to get more: no   Use of food assistance programs (WIC, food stamps, food quinn) no       Food & Nutrition Related History       Food Allergies: none  Food Intolerances: none indicated  Food Behavior: restrictive behavior  Nutrition Impact Symptoms:  none  Gastrointestinal conditions that impact intake or food choices: none  Details at home: lives with her , is retired  Who prepares most meals: patient   Who does grocery shopping: patient   How many meals are purchased from fast food/sit down restaurants per week:  unknown; did not discuss  Difficulty chewin -  "Normal  Difficulty swallowin - Normal  Diet requirement related to personal preference or cultural belief:  none indicated  History of eating disorder/disordered eating habits: None  Language/communication details: english  Barriers to learning: No barriers identified at this time    24 Hour Recall: please refer to questionnaire submission under misc reports for more information     Additional comments: Pt reports she typically has 1-2 meals daily. Pt will snack some throughout the day on bell peppers, cheese and crackers, granola bites, and beef jerky. Pt drinks 64-96 ounces of water daily, iced tea and some coffee. Pt reports she does not get hunger ques and she has tracked in the past and normally gets 900-1200 calories a day.     Anthropometrics      Height:   Ht Readings from Last 1 Encounters:   24 157.5 cm (62.01\")     Weight:   Wt Readings from Last 3 Encounters:   24 130 kg (286 lb 9.6 oz)   03/15/22 125 kg (275 lb)   22 124 kg (273 lb 6.4 oz)     BMI: There is no height or weight on file to calculate BMI.   Weight Change: pt reports no recent wt changes      Physical Activity     Barriers to physical activity: none indicated     Physical activity comments: pt is not involved in activity at this time outside of normal, daily chores      Estimated Needs     Estimated Energy Needs: 1,850 (1.2, 400)    Estimated Protein Needs: RD recommended 20-25 grams per small meal and 30 grams of carbs per small meal     Estimated Fluid Needs: minimum of 64 ounces daily and up to half of pts body wt in ounces     Discussion / Education      Consistency of meals: We discussed the importance of eating consistent, balanced meals to meet nutrient needs to promote satiety and satisfaction when eating. RD recommended patient to try and incorporate a small meal or snack in the morning and to avoid overeating and snacking throughout the day. RD explained meal patterns should be individualized from person to " person. We discussed how sometimes cravings are trigger due to skipping meals and not incorporating all 3 categories of nutrients.      The components of a healthy meal and snack: We discussed how the three main nutrients our bodies need are protein, carbohydrates, and fat. RD recommended the patient aim to have a source of lean protein, fiber rich carbohydrates, and heart healthy fats with each of her meals and snacks. RD provided examples, such as having some yogurt with her sandwich for additional carbohydrates and protein rather than just having a sandwich by itself. We discussed the benefits this provides, such as meeting nutrient needs, and promoting satiety and satisfaction when eating.     Assessment of patient engagement: Asked appropriate questions    Measurement of understanding: Patient able to demonstrate understanding with teach back     Resources Provided: RD provided resources after session:  Eating to feel your best  The 3 macronutrients  Macronutrient foundations  Quick meal ideas  Quick breakfast ideas    Goal (s)      Goal 1: breakfast, 5 days a week     Goal 2: 64 ounces of water daily (one glass after each small meal)     Plan of Care     PES Statement:   Overweight / Obesity related to diet and lifestye as evidenced by BMI.     Follow Up Visit      Follow Up:   3/7 at 3 PM    Total of 60 minutes spent with patient on nutrition counseling. Education based on Academy of Nutrition and Dietetics guidelines. Patient was provided with RD's contact information. Thank you for this referral.

## 2024-05-23 ENCOUNTER — OFFICE VISIT (OUTPATIENT)
Dept: ENDOCRINOLOGY | Facility: CLINIC | Age: 49
End: 2024-05-23
Payer: COMMERCIAL

## 2024-05-23 VITALS
OXYGEN SATURATION: 96 % | WEIGHT: 284 LBS | BODY MASS INDEX: 52.26 KG/M2 | HEART RATE: 80 BPM | HEIGHT: 62 IN | DIASTOLIC BLOOD PRESSURE: 80 MMHG | SYSTOLIC BLOOD PRESSURE: 126 MMHG

## 2024-05-23 DIAGNOSIS — E66.01 CLASS 3 SEVERE OBESITY WITH SERIOUS COMORBIDITY AND BODY MASS INDEX (BMI) OF 50.0 TO 59.9 IN ADULT, UNSPECIFIED OBESITY TYPE: ICD-10-CM

## 2024-05-23 DIAGNOSIS — R63.8 UNABLE TO LOSE WEIGHT: ICD-10-CM

## 2024-05-23 DIAGNOSIS — R73.03 PREDIABETES: Primary | ICD-10-CM

## 2024-05-23 LAB
EXPIRATION DATE: ABNORMAL
EXPIRATION DATE: ABNORMAL
GLUCOSE BLDC GLUCOMTR-MCNC: 140 MG/DL (ref 70–130)
HBA1C MFR BLD: 6.2 % (ref 4.5–5.7)
Lab: ABNORMAL
Lab: ABNORMAL

## 2024-05-23 RX ORDER — LANCETS 30 GAUGE
EACH MISCELLANEOUS
Qty: 100 EACH | Refills: 3 | Status: SHIPPED | OUTPATIENT
Start: 2024-05-23

## 2024-05-23 RX ORDER — DEXAMETHASONE 1 MG
1 TABLET ORAL ONCE
Qty: 1 TABLET | Refills: 0 | Status: SHIPPED | OUTPATIENT
Start: 2024-05-23 | End: 2024-05-23

## 2024-05-23 RX ORDER — ACYCLOVIR 400 MG/1
1 TABLET ORAL
Qty: 3 EACH | Refills: 5 | Status: SHIPPED | OUTPATIENT
Start: 2024-05-23

## 2024-05-23 NOTE — PROGRESS NOTES
Chief Complaint   Patient presents with    Diabetes        New patient who is being seen in consultation regarding diabetes at the request of Mckenna Aldrich, *     HPI   Lyndon Monaco is a 48 y.o. female who presents for evaluation of diabetes.    Patient reports that diagnosis was made  on labs in 2024.  She reports that she requested lab check given strong family history of diabetes.  She is not currently taking any medication to alter blood glucose.  She does report that she has met with the dietitian but did not find this particularly helpful.  She reports that she does not always feel hungry and will often miss meals.  Due to this, overall caloric intake is low.  She is not currently monitoring blood glucose, she does inquire about prescription for Dexcom.    Patient reports longstanding issues with weight gain/difficulty losing weight.  She reports that she underwent menopause early in life which led to a weight gain from approximately 175 to 220 pounds.  Following that, she has had intermittent periods of significant weight gain, weight generally stable for the past 3 to 4 years.  She does report that she does yoga and stretching on a daily basis and will also do separate aerobic exercise at least 1 day/week, she walks more in the summer.  She reports that her sister has Cushing's disease and she is interested in ruling out barriers to weight loss.    Past Medical History:   Diagnosis Date    Obesity     Type 2 diabetes mellitus      Past Surgical History:   Procedure Laterality Date     SECTION      CHOLECYSTECTOMY      KNEE ARTHROSCOPY      TONSILLECTOMY        Family History   Problem Relation Age of Onset    Cancer Mother     Diabetes Mother     Lung cancer Mother     [place]'s disease Father     Diabetes Sister         Pre DM    Cushing syndrome Sister     Allergies Sister     Celiac disease Brother     Cancer Maternal Aunt     Diabetes Maternal Aunt     Diabetes Maternal Aunt      "Diabetes Maternal Aunt     Diabetes Maternal Uncle     Diabetes Maternal Grandmother     Stroke Maternal Grandmother     Stroke Paternal Grandmother     Allergies Daughter       Social History     Socioeconomic History    Marital status:    Tobacco Use    Smoking status: Never    Smokeless tobacco: Never   Vaping Use    Vaping status: Never Used   Substance and Sexual Activity    Alcohol use: Never    Drug use: Never    Sexual activity: Not Currently     Partners: Male     Birth control/protection: Post-menopausal      No Known Allergies   No current outpatient medications on file prior to visit.     No current facility-administered medications on file prior to visit.        Review of Systems   Constitutional:  Positive for unexpected weight gain.   Genitourinary:  Positive for dyspareunia.       Vitals:    05/23/24 1434   BP: 126/80   BP Location: Right arm   Patient Position: Sitting   Cuff Size: Adult   Pulse: 80   SpO2: 96%   Weight: 129 kg (284 lb)   Height: 157.5 cm (62\")   Body mass index is 51.94 kg/m².     Physical Exam  Vitals reviewed.   Constitutional:       General: She is not in acute distress.     Appearance: She is morbidly obese.   Cardiovascular:      Rate and Rhythm: Normal rate and regular rhythm.   Pulmonary:      Effort: Pulmonary effort is normal.      Breath sounds: Normal breath sounds.   Neurological:      Mental Status: She is alert.   Psychiatric:         Mood and Affect: Mood and affect normal.         Behavior: Behavior is cooperative.        Labs/Imaging  Glucose:  Lab Results   Component Value Date    POCGLU 140 (A) 05/23/2024     HbA1c:  Lab Results   Component Value Date    HGBA1C 6.2 (A) 05/23/2024    HGBA1C 6.90 (H) 01/25/2024     Lipid Panel:  Lab Results   Component Value Date    CHOL 268 (H) 01/25/2024    TRIG 100 01/25/2024    HDL 42 01/25/2024     (H) 01/25/2024    VLDL 18 01/25/2024    LDLHDL 4.90 01/25/2024     CMP:  Lab Results   Component Value Date    BUN " 10 01/25/2024    CREATININE 0.81 01/25/2024    BCR 12.3 01/25/2024     01/25/2024    K 4.3 01/25/2024    CO2 25.7 01/25/2024    CALCIUM 9.2 01/25/2024    ALBUMIN 4.1 01/25/2024    BILITOT 0.4 01/25/2024    ALKPHOS 90 01/25/2024    AST 22 01/25/2024    ALT 28 01/25/2024         Latest Reference Range & Units 01/25/24 14:20   TSH Baseline 0.270 - 4.200 uIU/mL 1.790     Assessment and Plan    Diagnoses and all orders for this visit:    1. Prediabetes (Primary)  -     POC Glucose, Blood  -     POC Glycosylated Hemoglobin (Hb A1C)  -     Blood Glucose Monitoring Suppl device; Use as directed daily ok to change to ins formulary  Dispense: 1 each; Refill: 0  -     glucose blood test strip; Use as directed daily ok to change to ins formulary  Dispense: 50 each; Refill: 5  -     Lancets misc; Use as directed daily ok to change to ins formulary  Dispense: 100 each; Refill: 3  Discussed criteria for diagnosis of diabetes, patient had a hemoglobin A1c of 6.9% in January but this is her only prior A1c in the diabetic range.  Per available data, she has not met other diagnostic criteria for diabetes.    Hemoglobin A1C improved during visit today, 6.2%  Discussed factors impacting glycemic control.  Patient reports that she has met with a dietitian and eats a low carbohydrate, low calorie diet.  She reports that she is physically active.  We did discuss factors impacting insulin resistance and the impact of obesity on the risk of developing glucose abnormalities.  We discussed medical therapy is available for prediabetes.  Patient is not interested in initiation of metformin at this time.  I discussed recommendations for glucose monitoring and symptoms of worsening hyperglycemia.  Dexcom was sent to patient's pharmacy per her preference.  Supplies for fingerstick monitoring were also submitted to pharmacy.    2. Unable to lose weight  -     Cortisol - AM; Future  -     dexAMETHasone (DECADRON) 1 MG tablet; Take 1 tablet by  mouth 1 (One) Time for 1 dose. Take at 11 PM night before lab draw at 8 AM  Dispense: 1 tablet; Refill: 0  -     Dexamethasone Level, Serum; Future  Discussed endocrine barriers to weight loss, prior thyroid function testing is normal.  Plan to screen for Cushing's disease via dexamethasone suppression test.    3. Class 3 severe obesity with serious comorbidity and body mass index (BMI) of 50.0 to 59.9 in adult, unspecified obesity type   Referral placed to weight management program.    Other orders  -     Continuous Glucose Sensor (Dexcom G7 Sensor) misc; Use 1 each Every 10 (Ten) Days.  Dispense: 3 each; Refill: 5         Return in about 6 months (around 11/23/2024). The patient was instructed to contact the clinic with any interval questions or concerns.    Electronically signed by: Mattie Cr MD     Dictated Utilizing Dragon Dictation

## 2024-05-24 ENCOUNTER — PRIOR AUTHORIZATION (OUTPATIENT)
Dept: ENDOCRINOLOGY | Facility: CLINIC | Age: 49
End: 2024-05-24
Payer: COMMERCIAL

## 2024-05-24 NOTE — TELEPHONE ENCOUNTER
Lyndon Monaco ([place]: [id number])  PA Case ID #: [id number]  Rx #: [id number]  Need Help? Call us at [phone]  Outcome Denied today  Request Reference Number: [id number]. DEXCOM G7 MIS SENSOR is denied for not meeting the prior authorization requirement(s). Details of this decision are in the notice attached below or have been faxed to you.  Drug Dexcom G7 Sensor  ePA cloud logo Form [employer] Electronic Prior Authorization Form (2017 NCPDP) Original Claim Info 75    The request for coverage for DEXCOM G7 MIS SENSOR, use as directed (3 per month), is denied.  This decision is based on health plan criteria for DEXCOM G7 MIS SENSOR. DEXCOM G7 MIS  SENSOR is covered only if:  Both of the following:  (1) You are on an intensive insulin regimen (three or more insulin injections per day or use a continuous  subcutaneous insulin infusion pump).  (2) You regularly monitor your blood glucose four or more times per day.  The information provided does not show that you meet the criteria listed above.  Reviewed by: Felicia chávez.    Left message for pt to return call.

## 2024-12-31 ENCOUNTER — PROCEDURE VISIT (OUTPATIENT)
Dept: FAMILY MEDICINE CLINIC | Facility: CLINIC | Age: 49
End: 2024-12-31
Payer: COMMERCIAL

## 2024-12-31 VITALS
DIASTOLIC BLOOD PRESSURE: 90 MMHG | OXYGEN SATURATION: 98 % | HEART RATE: 68 BPM | HEIGHT: 62 IN | WEIGHT: 288.8 LBS | SYSTOLIC BLOOD PRESSURE: 146 MMHG | BODY MASS INDEX: 53.15 KG/M2

## 2024-12-31 DIAGNOSIS — S61.217D LACERATION OF LEFT LITTLE FINGER WITHOUT FOREIGN BODY WITHOUT DAMAGE TO NAIL, SUBSEQUENT ENCOUNTER: Primary | ICD-10-CM

## 2024-12-31 NOTE — PROGRESS NOTES
Office Note     Name: Lyndon Monaco    : 1975     MRN: [id number]     Chief Complaint  Hand Injury (ED Visit 24 due to Laceration Left [place])    Subjective     History of Present Illness:  Lyndon Monaco is a 49 y.o. female who presents today for follow up of laceration of left 5th digit.     Patient states she injured the left fifth digit on 2024 while shaving a pair of scissors.  She sought care at [facility] ED and received 1 suture.  She has kept the area bandaged with daily dressing changes since that time.  She denies any pain, bleeding, malfunction of area.  She denies fever, chills, nausea, vomiting.    Review of Systems:   Review of Systems   Skin:  Positive for wound.        Past Medical History:   Past Medical History:   Diagnosis Date    Obesity     Type 2 diabetes mellitus        Past Surgical History:   Past Surgical History:   Procedure Laterality Date     SECTION      CHOLECYSTECTOMY      KNEE ARTHROSCOPY      TONSILLECTOMY         Family History:   Family History   Problem Relation Age of Onset    Cancer Mother     Diabetes Mother     Lung cancer Mother     [place]'s disease Father     Diabetes Sister         Pre DM    Cushing syndrome Sister     Allergies Sister     Celiac disease Brother     Cancer Maternal Aunt     Diabetes Maternal Aunt     Diabetes Maternal Aunt     Diabetes Maternal Aunt     Diabetes Maternal Uncle     Diabetes Maternal Grandmother     Stroke Maternal Grandmother     Stroke Paternal Grandmother     Allergies Daughter        Social History:   Social History     Socioeconomic History    Marital status:    Tobacco Use    Smoking status: Never     Passive exposure: Never    Smokeless tobacco: Never   Vaping Use    Vaping status: Never Used   Substance and Sexual Activity    Alcohol use: Never    Drug use: Never    Sexual activity: Not Currently     Partners: Male     Birth control/protection: Post-menopausal       Immunizations:  "  Immunization History   Administered Date(s) Administered    Tdap 01/25/2024        Medications:   No current outpatient medications on file.    Allergies:   Allergies   Allergen Reactions    Benzalkonium Chloride Hives    Latex Hives       Objective     Vital Signs  /90   Pulse 68   Ht 157.5 cm (62.01\")   Wt 131 kg (288 lb 12.8 oz)   SpO2 98%   BMI 52.81 kg/m²   Estimated body mass index is 52.81 kg/m² as calculated from the following:    Height as of this encounter: 157.5 cm (62.01\").    Weight as of this encounter: 131 kg (288 lb 12.8 oz).           Physical Exam  Vitals reviewed.   Constitutional:       Appearance: Normal appearance.   HENT:      Head: Normocephalic and atraumatic.   Skin:     Comments: Small laceration of left 5th digit with one intact suture. No surrounding edema, erythema, or drainage.    Neurological:      Mental Status: She is alert.          Suture Removal    Date/Time: 12/31/2024 4:20 PM    Performed by: Mattie Shabazz PA-C  Authorized by: Mattie Shabazz PA-C  Body area: upper extremity  Location details: left small finger  Wound Appearance: clean  Sutures Removed: 1  Post-removal: dressing applied  Patient tolerance: patient tolerated the procedure well with no immediate complications           Results:  No results found for this or any previous visit (from the past 24 hours).     Assessment and Plan     Assessment/Plan:  Assessment & Plan  Laceration of left little finger without foreign body without damage to nail, subsequent encounter  Suture removed without complication 7 days after initial placement. Wound looks clean, well healed.            Mattie Shabazz PA-C   [facility]  "

## 2025-02-06 ENCOUNTER — LAB (OUTPATIENT)
Dept: LAB | Facility: HOSPITAL | Age: 50
End: 2025-02-06
Payer: COMMERCIAL

## 2025-02-06 DIAGNOSIS — R63.8 UNABLE TO LOSE WEIGHT: ICD-10-CM

## 2025-02-06 LAB — CORTIS AM PEAK SERPL-MCNC: 18.51 MCG/DL

## 2025-02-06 PROCEDURE — 80299 QUANTITATIVE ASSAY DRUG: CPT

## 2025-02-06 PROCEDURE — 82533 TOTAL CORTISOL: CPT

## 2025-02-07 DIAGNOSIS — R63.8 UNABLE TO LOSE WEIGHT: Primary | ICD-10-CM

## 2025-02-07 RX ORDER — DEXAMETHASONE 1 MG
1 TABLET ORAL ONCE
Qty: 1 TABLET | Refills: 0 | Status: SHIPPED | OUTPATIENT
Start: 2025-02-07 | End: 2025-02-07

## 2025-02-13 LAB — DEXAMETHASONE SERPL-MCNC: <30 NG/DL

## 2025-02-18 ENCOUNTER — TELEPHONE (OUTPATIENT)
Dept: ENDOCRINOLOGY | Facility: CLINIC | Age: 50
End: 2025-02-18
Payer: COMMERCIAL

## 2025-02-18 NOTE — TELEPHONE ENCOUNTER
LEFT MESSAGE TO RETURN call - did she take the dexamethasone at 11pm  before 8am cortisol blood draw?

## 2025-07-29 ENCOUNTER — OFFICE VISIT (OUTPATIENT)
Dept: FAMILY MEDICINE CLINIC | Facility: CLINIC | Age: 50
End: 2025-07-29
Payer: COMMERCIAL

## 2025-07-29 VITALS
BODY MASS INDEX: 52.67 KG/M2 | HEIGHT: 62 IN | HEART RATE: 81 BPM | OXYGEN SATURATION: 98 % | SYSTOLIC BLOOD PRESSURE: 130 MMHG | TEMPERATURE: 96.8 F | WEIGHT: 286.2 LBS | DIASTOLIC BLOOD PRESSURE: 80 MMHG

## 2025-07-29 PROCEDURE — 99213 OFFICE O/P EST LOW 20 MIN: CPT

## 2025-07-29 RX ORDER — PEN NEEDLE, DIABETIC 31 GX5/16"
1 NEEDLE, DISPOSABLE MISCELLANEOUS TAKE AS DIRECTED
Qty: 100 EACH | Refills: 3 | Status: SHIPPED | OUTPATIENT
Start: 2025-07-29

## 2025-07-29 RX ORDER — ONDANSETRON 4 MG/1
4 TABLET, FILM COATED ORAL EVERY 8 HOURS PRN
Qty: 30 TABLET | Refills: 0 | Status: SHIPPED | OUTPATIENT
Start: 2025-07-29

## 2025-07-29 NOTE — PROGRESS NOTES
Office Note     Name: Lyndon Monaco    : 1975     MRN: [id number]     Chief Complaint  Weight Loss (Options, needs to be through PCP to be covered)    Subjective       History of Present Illness  The patient is a 49-year-old female who presents to discuss weight loss options.    She has been considering weight management options for several months and is now ready to proceed. She is not interested in bariatric surgery at this time. She has been informed that her insurance will cover Zepbound, provided it is prescribed by her primary care physician. Over the past several months, she has been making conscious efforts to maintain a healthy diet and exercise regularly. She has been focusing on increasing her fiber intake over the past 5 to 6 months and reports regular bowel movements. She exercises a few times a week and maintains a healthy lifestyle. She has never experienced any gastrointestinal issues.    She has been dealing with obesity since her early 20s and has tried various medications, all of which had side effects. She was informed that her weight issues could be hormonal, but no specific treatment was suggested. Over the past two years, she has lost 20 pounds but gained back 25 pounds. She has been working on improving her eating habits over the past five years and currently eats breakfast most days. She is more concerned about preventing further weight gain than losing weight.        Review of Systems:   Review of Systems   Constitutional:  Negative for chills, fatigue and fever.   HENT:  Negative for congestion.    Respiratory:  Negative for cough, shortness of breath and wheezing.    Cardiovascular:  Negative for chest pain.   Gastrointestinal:  Negative for abdominal pain.   Musculoskeletal:  Negative for myalgias.   Neurological:  Negative for dizziness and headaches.        Past Medical History:   Past Medical History:   Diagnosis Date    History of medical problems     Premature Ovarian  Failure    Hyperlipidemia     Obesity     Type 2 diabetes mellitus        Past Surgical History:   Past Surgical History:   Procedure Laterality Date     SECTION      CHOLECYSTECTOMY      KNEE ARTHROSCOPY      TONSILLECTOMY         Family History:   Family History   Problem Relation Age of Onset    Cancer Mother     Diabetes Mother     Lung cancer Mother     Obesity Mother     [place]'s disease Father     Diabetes Sister         Pre DM    Cushing syndrome Sister     Allergies Sister     Celiac disease Brother     Cancer Maternal Aunt     Diabetes Maternal Aunt     Obesity Maternal Aunt     Diabetes Maternal Aunt     Diabetes Maternal Aunt     Diabetes Maternal Uncle     Diabetes Maternal Grandmother     Stroke Maternal Grandmother     Obesity Maternal Grandmother     Stroke Paternal Grandmother     Allergies Daughter     Obesity Sister        Social History:   Social History     Socioeconomic History    Marital status:    Tobacco Use    Smoking status: Never     Passive exposure: Never    Smokeless tobacco: Never   Vaping Use    Vaping status: Never Used   Substance and Sexual Activity    Alcohol use: Never    Drug use: Never    Sexual activity: Not Currently     Partners: Male     Birth control/protection: Post-menopausal       Immunizations:   Immunization History   Administered Date(s) Administered    Tdap 2024        Medications:     Current Outpatient Medications:     Alcohol Swabs (Alcohol Prep) pads, Use 1 each Take As Directed. Use daily to clean skin before needle stick, Disp: 100 each, Rfl: 3    ondansetron (Zofran) 4 MG tablet, Take 1 tablet by mouth Every 8 (Eight) Hours As Needed for Nausea or Vomiting., Disp: 30 tablet, Rfl: 0    Tirzepatide-Weight Management (ZEPBOUND) 2.5 MG/0.5ML solution auto-injector, Inject 0.5 mL under the skin into the appropriate area as directed 1 (One) Time Per Week., Disp: 2 mL, Rfl: 2    Allergies:   Allergies   Allergen Reactions    Benzalkonium  "Chloride Hives    Latex Hives       Objective     Vital Signs  /80 (BP Location: Right arm, Patient Position: Sitting, Cuff Size: Adult)   Pulse 81   Temp 96.8 °F (36 °C) (Temporal)   Ht 157.5 cm (62.01\")   Wt 130 kg (286 lb 3.2 oz)   SpO2 98%   BMI 52.33 kg/m²   Estimated body mass index is 52.33 kg/m² as calculated from the following:    Height as of this encounter: 157.5 cm (62.01\").    Weight as of this encounter: 130 kg (286 lb 3.2 oz).           Physical Exam  Vitals reviewed.   Constitutional:       General: She is not in acute distress.     Appearance: Normal appearance. She is obese. She is not ill-appearing.   HENT:      Head: Normocephalic and atraumatic.      Nose: Nose normal. No congestion.      Mouth/Throat:      Mouth: Mucous membranes are moist.      Pharynx: Oropharynx is clear. No oropharyngeal exudate or posterior oropharyngeal erythema.   Eyes:      Pupils: Pupils are equal, round, and reactive to light.   Cardiovascular:      Rate and Rhythm: Normal rate and regular rhythm.      Pulses: Normal pulses.      Heart sounds: Normal heart sounds. No murmur heard.  Pulmonary:      Effort: Pulmonary effort is normal. No respiratory distress.      Breath sounds: Normal breath sounds. No wheezing or rales.   Lymphadenopathy:      Cervical: No cervical adenopathy.   Neurological:      Mental Status: She is alert.          Procedures     Results:  No results found for this or any previous visit (from the past 24 hours).     Assessment and Plan     Assessment/Plan:  Assessment & Plan  BMI 50.0-59.9, adult    Orders:    Tirzepatide-Weight Management (ZEPBOUND) 2.5 MG/0.5ML solution auto-injector; Inject 0.5 mL under the skin into the appropriate area as directed 1 (One) Time Per Week.    Alcohol Swabs (Alcohol Prep) pads; Use 1 each Take As Directed. Use daily to clean skin before needle stick    ondansetron (Zofran) 4 MG tablet; Take 1 tablet by mouth Every 8 (Eight) Hours As Needed for Nausea " or Vomiting.      Assessment & Plan  1. Obesity.  - She is not interested in bariatric surgery at this time.  - A prescription for Zepbound 2.5 mg per week has been provided, along with alcohol prep wipes for skin preparation prior to injection.  - The potential side effects of Zepbound, including nausea, bloating, gastrointestinal upset, and constipation, were discussed. It was explained that these side effects are indicative of the medication's mechanism of action, which involves slowing down GI transit to prolong digestion and maintain satiety.  - The dosage of Zepbound can be increased up to 15 mg, but this will be done gradually based on her tolerance. If she experiences severe side effects at a particular dose, the dosage will be reduced to the highest level she can tolerate. The importance of maintaining a healthy diet and regular exercise while on this medication was emphasized. A prescription for Zofran has also been provided to manage any nausea that may occur. The prescription will be sent to the pharmacy.        Follow Up: 10/2/2025 with Dr. Aldrich    Patient or patient representative verbalized consent for the use of Ambient Listening during the visit with  Mattie Shabazz PA-C for chart documentation. 7/29/2025 14:50 EST        Mattie Shabazz PA-C   [facility]

## 2025-07-30 ENCOUNTER — PRIOR AUTHORIZATION (OUTPATIENT)
Dept: FAMILY MEDICINE CLINIC | Facility: CLINIC | Age: 50
End: 2025-07-30
Payer: COMMERCIAL

## 2025-07-30 NOTE — TELEPHONE ENCOUNTER
(Key: [id number]) - [id number]  Zepbound 2.5MG/0.5ML pen-injectors  status: PA RequestCreated: July 29th, 2025 [phone]: July 30th, 2025    Message from Plan  Request Reference Number: [id number]. ZEPBOUND INJ 2.5/0.5 is approved through 01/30/2026. Your patient may now fill this prescription and it will be covered.. Authorization Expiration Date: January 30, 2026.